# Patient Record
Sex: MALE | Race: WHITE | NOT HISPANIC OR LATINO | Employment: OTHER | URBAN - METROPOLITAN AREA
[De-identification: names, ages, dates, MRNs, and addresses within clinical notes are randomized per-mention and may not be internally consistent; named-entity substitution may affect disease eponyms.]

---

## 2024-10-15 DIAGNOSIS — I73.9 PAD (PERIPHERAL ARTERY DISEASE): Primary | ICD-10-CM

## 2024-12-16 NOTE — PROGRESS NOTES
[facility] Vascular - Clinic Note  Alon Izaguirre MD      Patient Name: Driss Morales                   : 1960      MRN: [id number]   Visit Date: 2024       History Present Illness     Reason for Visit: Arterial Disease    Mr. Morales presents to the clinic for evaluation of right lower extremity arterial disease.  He was referred over by Dr. Isaac in [place] for evaluation osteomyelitis and arterial insufficiency.  He has a history of bilateral BKAs 13 years ago after a swine flu infection.  He recently developed some osteomyelitis in his right residual limb.  He was treated with antibiotics.  He was states that a surgeon in [place] told him he needed an above-knee amputation which he did not want to proceed with.  He presents today for evaluation.  He denies any symptoms of pain or ischemia to the right lower extremity        REVIEW OF SYSTEMS:  12 point review of systems conducted, negative except as stated in the history of present illness. See HPI for details.        Physical Exam      Vitals:    24 1130 24 1133   BP: (!) 141/86 (!) 142/92   BP Location: Left arm Right arm   Patient Position: Sitting    Pulse: 99 (P) 101   Weight: 108.9 kg (240 lb)    Height: 6' (1.829 m)           General: well-nourished, no acute distress, and healthy appearing, alert, pleasant, conversant, and oriented  Neurologic: cranial nerves are grossly intact, no neurologic deficits, no motor deficits, and no sensory deficits  Neck/Chest: normal , soft without lymphadenopathy, and no carotid bruits noted  Respiratory: breathing easily, without respiratory distress, and normal breath sounds  Abdomen: normal and soft  Cardiology: regular rate and rhythm and no audible murmur    Upper Extremity Arterial Exam:   Right - radial is palpable and brachial is palpable  Left - radial is palpable and brachial is palpable    Musculoskeletal:   Lower Extremity: right leg is amputated below the knee and left  leg is amputated below the knee 5/5 strength     Wound:   Location: right stump  Small superficial opening at the distal aspect.  Below-knee lower extremity musculature is somewhat atrophic.              Assessment and Plan     Mr. Morales is a 64 y.o. male with right lower extremity critical limb ischemia.  I discussed with him that his runoff is somewhat perilous.  I would recommend right-to-left fem-fem bypass.  I think even if he does get an AKA at this point, he would have significant wound healing issues.  He does see Wound Care and Infectious Disease in [place].  We will plan for bypass.  I did explain the risks and benefits to him.  I also recommend he start a low-dose 81 mg aspirin at this time as well.  Is at higher risk for further limb loss down the road as well.      1. Amputation stump infection  - Basic Metabolic Panel; Future  - CBC Auto Differential; Future  - EKG 12-lead; Future  - X-Ray Chest PA And Lateral; Future    2. Thrombosis of arteries of lower extremity  - Basic Metabolic Panel; Future  - CBC Auto Differential; Future  - EKG 12-lead; Future  - X-Ray Chest PA And Lateral; Future    3. Atherosclerosis of native arteries of extremities with intermittent claudication, right leg          Imaging Obtained/Reviewed   Study: CTA aorta/runoff  Date:   10/17/24  This shows an occlusion of the right common iliac through external iliac and common femoral.  The profunda refills via collaterals and is the only runoff to the right lower extremity.    There is a mention of a report of an MRI showing osteomyelitis in the right BKA residual limb, however I do not have the report itself    Medical History     Past Medical History:   Diagnosis Date    HTN (hypertension)     Osteomyelitis     lower leg    Peripheral artery occlusion     Peripheral vascular obstructive disease      Past Surgical History:   Procedure Laterality Date    BELOW KNEE AMPUTATION OF LOWER EXTREMITY Bilateral 2011    secondary to  Swine Flu infection     No family history on file.  Social History     Socioeconomic History    Marital status:    Tobacco Use    Smoking status: Former     Types: Cigarettes    Smokeless tobacco: Never     Current Outpatient Medications   Medication Instructions    ciprofloxacin HCl (CIPRO) 750 mg, 2 times daily    doxycycline (MONODOX) 100 mg, 2 times daily    eszopiclone (LUNESTA) 2 mg, Nightly PRN    gabapentin (NEURONTIN) 800 mg, 3 times daily    lisinopriL 10 mg    oxyCODONE (ROXICODONE) 15 mg    oxyCODONE-acetaminophen (PERCOCET)  mg per tablet 1 tablet, Every 4 hours PRN     Review of patient's allergies indicates:   Allergen Reactions    Penicillins        Patient Care Team:  Alex Palmer Jr., MD as PCP - General (Family Medicine)  Mackenzie Isaac NP as Nurse Practitioner (Cardiology)        No follow-ups on file. In addition to their scheduled follow up, the patient has also been instructed to follow up on as needed basis.     No future appointments.

## 2024-12-16 NOTE — H&P (VIEW-ONLY)
[facility] Vascular - Clinic Note  Alon Izaguirre MD      Patient Name: Driss Morales                   : 1960      MRN: [id number]   Visit Date: 2024       History Present Illness     Reason for Visit: Arterial Disease    Mr. Morales presents to the clinic for evaluation of right lower extremity arterial disease.  He was referred over by Dr. Isaac in [place] for evaluation osteomyelitis and arterial insufficiency.  He has a history of bilateral BKAs 13 years ago after a swine flu infection.  He recently developed some osteomyelitis in his right residual limb.  He was treated with antibiotics.  He was states that a surgeon in [place] told him he needed an above-knee amputation which he did not want to proceed with.  He presents today for evaluation.  He denies any symptoms of pain or ischemia to the right lower extremity        REVIEW OF SYSTEMS:  12 point review of systems conducted, negative except as stated in the history of present illness. See HPI for details.        Physical Exam      Vitals:    24 1130 24 1133   BP: (!) 141/86 (!) 142/92   BP Location: Left arm Right arm   Patient Position: Sitting    Pulse: 99 (P) 101   Weight: 108.9 kg (240 lb)    Height: 6' (1.829 m)           General: well-nourished, no acute distress, and healthy appearing, alert, pleasant, conversant, and oriented  Neurologic: cranial nerves are grossly intact, no neurologic deficits, no motor deficits, and no sensory deficits  Neck/Chest: normal , soft without lymphadenopathy, and no carotid bruits noted  Respiratory: breathing easily, without respiratory distress, and normal breath sounds  Abdomen: normal and soft  Cardiology: regular rate and rhythm and no audible murmur    Upper Extremity Arterial Exam:   Right - radial is palpable and brachial is palpable  Left - radial is palpable and brachial is palpable    Musculoskeletal:   Lower Extremity: right leg is amputated below the knee and left  leg is amputated below the knee 5/5 strength     Wound:   Location: right stump  Small superficial opening at the distal aspect.  Below-knee lower extremity musculature is somewhat atrophic.              Assessment and Plan     Mr. Morales is a 64 y.o. male with right lower extremity critical limb ischemia.  I discussed with him that his runoff is somewhat perilous.  I would recommend right-to-left fem-fem bypass.  I think even if he does get an AKA at this point, he would have significant wound healing issues.  He does see Wound Care and Infectious Disease in [place].  We will plan for bypass.  I did explain the risks and benefits to him.  I also recommend he start a low-dose 81 mg aspirin at this time as well.  Is at higher risk for further limb loss down the road as well.      1. Amputation stump infection  - Basic Metabolic Panel; Future  - CBC Auto Differential; Future  - EKG 12-lead; Future  - X-Ray Chest PA And Lateral; Future    2. Thrombosis of arteries of lower extremity  - Basic Metabolic Panel; Future  - CBC Auto Differential; Future  - EKG 12-lead; Future  - X-Ray Chest PA And Lateral; Future    3. Atherosclerosis of native arteries of extremities with intermittent claudication, right leg          Imaging Obtained/Reviewed   Study: CTA aorta/runoff  Date:   10/17/24  This shows an occlusion of the right common iliac through external iliac and common femoral.  The profunda refills via collaterals and is the only runoff to the right lower extremity.    There is a mention of a report of an MRI showing osteomyelitis in the right BKA residual limb, however I do not have the report itself    Medical History     Past Medical History:   Diagnosis Date    HTN (hypertension)     Osteomyelitis     lower leg    Peripheral artery occlusion     Peripheral vascular obstructive disease      Past Surgical History:   Procedure Laterality Date    BELOW KNEE AMPUTATION OF LOWER EXTREMITY Bilateral 2011    secondary to  Swine Flu infection     No family history on file.  Social History     Socioeconomic History    Marital status:    Tobacco Use    Smoking status: Former     Types: Cigarettes    Smokeless tobacco: Never     Current Outpatient Medications   Medication Instructions    ciprofloxacin HCl (CIPRO) 750 mg, 2 times daily    doxycycline (MONODOX) 100 mg, 2 times daily    eszopiclone (LUNESTA) 2 mg, Nightly PRN    gabapentin (NEURONTIN) 800 mg, 3 times daily    lisinopriL 10 mg    oxyCODONE (ROXICODONE) 15 mg    oxyCODONE-acetaminophen (PERCOCET)  mg per tablet 1 tablet, Every 4 hours PRN     Review of patient's allergies indicates:   Allergen Reactions    Penicillins        Patient Care Team:  Alex Palmer Jr., MD as PCP - General (Family Medicine)  Mackenzie Isaac NP as Nurse Practitioner (Cardiology)        No follow-ups on file. In addition to their scheduled follow up, the patient has also been instructed to follow up on as needed basis.     No future appointments.

## 2024-12-18 ENCOUNTER — OFFICE VISIT (OUTPATIENT)
Dept: VASCULAR SURGERY | Facility: CLINIC | Age: 64
End: 2024-12-18
Payer: MEDICARE

## 2024-12-18 VITALS
HEIGHT: 72 IN | SYSTOLIC BLOOD PRESSURE: 142 MMHG | HEART RATE: 99 BPM | BODY MASS INDEX: 32.51 KG/M2 | DIASTOLIC BLOOD PRESSURE: 92 MMHG | WEIGHT: 240 LBS

## 2024-12-18 DIAGNOSIS — T87.40 AMPUTATION STUMP INFECTION: Primary | ICD-10-CM

## 2024-12-18 DIAGNOSIS — I70.211 ATHEROSCLEROSIS OF NATIVE ARTERIES OF EXTREMITIES WITH INTERMITTENT CLAUDICATION, RIGHT LEG: ICD-10-CM

## 2024-12-18 DIAGNOSIS — I70.211 ATHEROSCLEROSIS OF NATIVE ARTERY OF RIGHT LOWER EXTREMITY WITH INTERMITTENT CLAUDICATION: ICD-10-CM

## 2024-12-18 DIAGNOSIS — I70.211 ATHEROSCLEROSIS OF NATIVE ARTERIES OF EXTREMITIES WITH INTERMITTENT CLAUDICATION, RIGHT LEG: Primary | ICD-10-CM

## 2024-12-18 DIAGNOSIS — I74.3 THROMBOSIS OF ARTERIES OF LOWER EXTREMITY: ICD-10-CM

## 2024-12-18 PROCEDURE — 3077F SYST BP >= 140 MM HG: CPT | Mod: CPTII,,, | Performed by: SURGERY

## 2024-12-18 PROCEDURE — 99204 OFFICE O/P NEW MOD 45 MIN: CPT | Mod: ,,, | Performed by: SURGERY

## 2024-12-18 PROCEDURE — 1101F PT FALLS ASSESS-DOCD LE1/YR: CPT | Mod: CPTII,,, | Performed by: SURGERY

## 2024-12-18 PROCEDURE — 3079F DIAST BP 80-89 MM HG: CPT | Mod: CPTII,,, | Performed by: SURGERY

## 2024-12-18 PROCEDURE — 3288F FALL RISK ASSESSMENT DOCD: CPT | Mod: CPTII,,, | Performed by: SURGERY

## 2024-12-18 PROCEDURE — 3008F BODY MASS INDEX DOCD: CPT | Mod: CPTII,,, | Performed by: SURGERY

## 2024-12-18 PROCEDURE — 4010F ACE/ARB THERAPY RXD/TAKEN: CPT | Mod: CPTII,,, | Performed by: SURGERY

## 2024-12-18 RX ORDER — SODIUM CHLORIDE 9 MG/ML
INJECTION, SOLUTION INTRAVENOUS CONTINUOUS
OUTPATIENT
Start: 2024-12-18

## 2024-12-19 RX ORDER — ASPIRIN 81 MG/1
81 TABLET ORAL DAILY
COMMUNITY

## 2024-12-30 ENCOUNTER — ANESTHESIA EVENT (OUTPATIENT)
Dept: SURGERY | Facility: HOSPITAL | Age: 64
End: 2024-12-30
Payer: MEDICARE

## 2025-01-06 ENCOUNTER — TELEPHONE (OUTPATIENT)
Dept: VASCULAR SURGERY | Facility: CLINIC | Age: 65
End: 2025-01-06
Payer: MEDICARE

## 2025-01-06 NOTE — PRE-PROCEDURE INSTRUCTIONS
"[facility]: Outpatient Surgery  Preprocedure Check-In Instructions     Your arrival time for your surgery or procedure is __7 am____.  We ask patients to arrive about 2 hours before surgery to allow for enough time to review your health history & medications, start your IV, complete any outstanding labwork or tests, and meet your Anesthesiologist.    Expectations: "Because of inconsistent procedure completion times, an unexpected wait may occur. The Physicians would like you to be here to prepare in the event they run ahead of time. We will make you as comfortable as possible and keep you informed. We apologize in advance if this happens."    You will arrive at [facility], [address], [place].  Enter through the [facility] entrance next to the Emergency Room, and come to the 6th floor to the Outpatient Surgery Department.     Visitory Policy:  You are allowed 2 adult visitors to be with you in the hospital. All hospital visitors should be in good current health.  No small children.     What to Bring:  Please have your ID, insurance cards, and all home medication bottles with you at check in.  Bring your CPAP machine if one is used at home.     Fasting:  Nothing to eat after midnight the night before your procedure. This includes no gum and/or tobacco products. You may have clear liquids limited to only: WATER, GATORADE, POWERADE and children can also have PEDIALYTE AND/OR APPLE JUICE , up until 2 hours before your arrival time.   Follow your doctor's instructions for taking any medications on the morning of your procedure.  If no instructions for taking medications were given, do not take any medications but bring your medications in their bottles to your procedure check in.     Follow your doctor's preoperative instructions regarding skin prep, bowel prep, bathing, or showering prior to your procedure.  If any special soaps were provided to you, please use according " to your doctor's instructions. If no instructions were given from your doctor, take a good bath or shower with antibacterial soap the night before and the morning of your procedure.  On the morning of procedure, wear loose, comfortable clothing.  No lotions, makeup, perfumes, colognes, deodorant, or jewelry to your procedure.  Removable items (glasses, contact lenses, dentures, retainers, hearing aids) need to be removed for your procedure.  Bring your storage containers for these items if you wear them.     Artificial nails, body jewelry, eyelash extensions, and/or hair extensions with metal clips are not allowed during your surgery.  If you currently wear any of these items, please arrange for them to be removed prior to your arrival to the hospital.     Outpatient or Same Day Surgeries:  Any patients receiving sedation/anesthesia are advised not to drive for 24 hours after their procedure.  We do not allow patients to drive themselves home after discharge.  If you are going home after your procedure, please have someone available to drive you home from the hospital.        You may call the Outpatient Surgery Department at [phone] with any questions or concerns.  We are looking forward to meeting you and taking great care of you for your procedure.  Thank you for choosing [facility] for your surgical needs.

## 2025-01-07 ENCOUNTER — ANESTHESIA (OUTPATIENT)
Dept: SURGERY | Facility: HOSPITAL | Age: 65
End: 2025-01-07
Payer: MEDICARE

## 2025-01-07 ENCOUNTER — HOSPITAL ENCOUNTER (INPATIENT)
Facility: HOSPITAL | Age: 65
LOS: 1 days | Discharge: HOME | DRG: 253 | End: 2025-01-08
Attending: SURGERY | Admitting: SURGERY
Payer: MEDICARE

## 2025-01-07 DIAGNOSIS — I70.211 ATHEROSCLEROSIS OF NATIVE ARTERIES OF EXTREMITIES WITH INTERMITTENT CLAUDICATION, RIGHT LEG: Primary | ICD-10-CM

## 2025-01-07 DIAGNOSIS — T87.40 AMPUTATION STUMP INFECTION: ICD-10-CM

## 2025-01-07 DIAGNOSIS — I74.3 THROMBOSIS OF ARTERIES OF LOWER EXTREMITY: ICD-10-CM

## 2025-01-07 LAB
ABORH RETYPE: NORMAL
BASOPHILS # BLD AUTO: 0.1 X10(3)/MCL
BASOPHILS NFR BLD AUTO: 1.4 %
EOSINOPHIL # BLD AUTO: 0.82 X10(3)/MCL (ref 0–0.9)
EOSINOPHIL NFR BLD AUTO: 11.3 %
ERYTHROCYTE [DISTWIDTH] IN BLOOD BY AUTOMATED COUNT: 14.4 % (ref 11.5–17)
GROUP & RH: NORMAL
HCT VFR BLD AUTO: 43.5 % (ref 42–52)
HGB BLD-MCNC: 14.6 G/DL (ref 14–18)
IMM GRANULOCYTES # BLD AUTO: 0.04 X10(3)/MCL (ref 0–0.04)
IMM GRANULOCYTES NFR BLD AUTO: 0.5 %
INDIRECT COOMBS: NORMAL
LYMPHOCYTES # BLD AUTO: 1.9 X10(3)/MCL (ref 0.6–4.6)
LYMPHOCYTES NFR BLD AUTO: 26.1 %
MCH RBC QN AUTO: 30.6 PG (ref 27–31)
MCHC RBC AUTO-ENTMCNC: 33.6 G/DL (ref 33–36)
MCV RBC AUTO: 91.2 FL (ref 80–94)
MONOCYTES # BLD AUTO: 0.53 X10(3)/MCL (ref 0.1–1.3)
MONOCYTES NFR BLD AUTO: 7.3 %
NEUTROPHILS # BLD AUTO: 3.89 X10(3)/MCL (ref 2.1–9.2)
NEUTROPHILS NFR BLD AUTO: 53.4 %
NRBC BLD AUTO-RTO: 0 %
PLATELET # BLD AUTO: 206 X10(3)/MCL (ref 130–400)
PMV BLD AUTO: 9.1 FL (ref 7.4–10.4)
RBC # BLD AUTO: 4.77 X10(6)/MCL (ref 4.7–6.1)
SPECIMEN OUTDATE: NORMAL
WBC # BLD AUTO: 7.28 X10(3)/MCL (ref 4.5–11.5)

## 2025-01-07 PROCEDURE — 71000033 HC RECOVERY, INTIAL HOUR: Performed by: SURGERY

## 2025-01-07 PROCEDURE — 63600175 PHARM REV CODE 636 W HCPCS: Performed by: PHYSICIAN ASSISTANT

## 2025-01-07 PROCEDURE — 21400001 HC TELEMETRY ROOM

## 2025-01-07 PROCEDURE — L8670 VASCULAR GRAFT, SYNTHETIC: HCPCS | Performed by: SURGERY

## 2025-01-07 PROCEDURE — 041L0JH BYPASS LEFT FEMORAL ARTERY TO RIGHT FEMORAL ARTERY WITH SYNTHETIC SUBSTITUTE, OPEN APPROACH: ICD-10-PCS | Performed by: SURGERY

## 2025-01-07 PROCEDURE — P9047 ALBUMIN (HUMAN), 25%, 50ML: HCPCS | Performed by: NURSE ANESTHETIST, CERTIFIED REGISTERED

## 2025-01-07 PROCEDURE — 11000001 HC ACUTE MED/SURG PRIVATE ROOM

## 2025-01-07 PROCEDURE — 85025 COMPLETE CBC W/AUTO DIFF WBC: CPT

## 2025-01-07 PROCEDURE — 25000003 PHARM REV CODE 250: Performed by: SURGERY

## 2025-01-07 PROCEDURE — 37000008 HC ANESTHESIA 1ST 15 MINUTES: Performed by: SURGERY

## 2025-01-07 PROCEDURE — 35661 BPG FEMORAL-FEMORAL: CPT | Mod: RT,,, | Performed by: SURGERY

## 2025-01-07 PROCEDURE — 63600175 PHARM REV CODE 636 W HCPCS: Mod: TB | Performed by: SURGERY

## 2025-01-07 PROCEDURE — 63600175 PHARM REV CODE 636 W HCPCS: Performed by: NURSE ANESTHETIST, CERTIFIED REGISTERED

## 2025-01-07 PROCEDURE — 88311 DECALCIFY TISSUE: CPT

## 2025-01-07 PROCEDURE — 35661 BPG FEMORAL-FEMORAL: CPT | Mod: AS,RT,, | Performed by: PHYSICIAN ASSISTANT

## 2025-01-07 PROCEDURE — 25000003 PHARM REV CODE 250: Performed by: NURSE ANESTHETIST, CERTIFIED REGISTERED

## 2025-01-07 PROCEDURE — 63600175 PHARM REV CODE 636 W HCPCS: Performed by: SURGERY

## 2025-01-07 PROCEDURE — 71000039 HC RECOVERY, EACH ADD'L HOUR: Performed by: SURGERY

## 2025-01-07 PROCEDURE — 86901 BLOOD TYPING SEROLOGIC RH(D): CPT | Performed by: SURGERY

## 2025-01-07 PROCEDURE — 37000009 HC ANESTHESIA EA ADD 15 MINS: Performed by: SURGERY

## 2025-01-07 PROCEDURE — 36415 COLL VENOUS BLD VENIPUNCTURE: CPT | Performed by: SURGERY

## 2025-01-07 PROCEDURE — 71000015 HC POSTOP RECOV 1ST HR: Performed by: SURGERY

## 2025-01-07 PROCEDURE — 86923 COMPATIBILITY TEST ELECTRIC: CPT | Performed by: SURGERY

## 2025-01-07 PROCEDURE — 36000709 HC OR TIME LEV III EA ADD 15 MIN: Performed by: SURGERY

## 2025-01-07 PROCEDURE — 71000016 HC POSTOP RECOV ADDL HR: Performed by: SURGERY

## 2025-01-07 PROCEDURE — 36000708 HC OR TIME LEV III 1ST 15 MIN: Performed by: SURGERY

## 2025-01-07 PROCEDURE — 27201423 OPTIME MED/SURG SUP & DEVICES STERILE SUPPLY: Performed by: SURGERY

## 2025-01-07 PROCEDURE — 88304 TISSUE EXAM BY PATHOLOGIST: CPT | Performed by: SURGERY

## 2025-01-07 PROCEDURE — 04CK0ZZ EXTIRPATION OF MATTER FROM RIGHT FEMORAL ARTERY, OPEN APPROACH: ICD-10-PCS | Performed by: SURGERY

## 2025-01-07 PROCEDURE — 25000003 PHARM REV CODE 250: Performed by: PHYSICIAN ASSISTANT

## 2025-01-07 DEVICE — PROPATEN VASCULAR GRAFT TW RR 8MMX40CM 30CM RINGS HEPARIN
Type: IMPLANTABLE DEVICE | Site: FEMORAL | Status: FUNCTIONAL
Brand: GORE PROPATEN VASCULAR GRAFT

## 2025-01-07 RX ORDER — ACETAMINOPHEN 10 MG/ML
INJECTION, SOLUTION INTRAVENOUS
Status: DISCONTINUED | OUTPATIENT
Start: 2025-01-07 | End: 2025-01-07

## 2025-01-07 RX ORDER — ROCURONIUM BROMIDE 10 MG/ML
INJECTION, SOLUTION INTRAVENOUS
Status: DISCONTINUED | OUTPATIENT
Start: 2025-01-07 | End: 2025-01-07

## 2025-01-07 RX ORDER — KETOROLAC TROMETHAMINE 10 MG/1
10 TABLET, FILM COATED ORAL EVERY 6 HOURS PRN
COMMUNITY
Start: 2025-01-06

## 2025-01-07 RX ORDER — VANCOMYCIN HYDROCHLORIDE 1 G/20ML
INJECTION, POWDER, LYOPHILIZED, FOR SOLUTION INTRAVENOUS
Status: DISCONTINUED | OUTPATIENT
Start: 2025-01-07 | End: 2025-01-07 | Stop reason: HOSPADM

## 2025-01-07 RX ORDER — DEXAMETHASONE SODIUM PHOSPHATE 4 MG/ML
INJECTION, SOLUTION INTRA-ARTICULAR; INTRALESIONAL; INTRAMUSCULAR; INTRAVENOUS; SOFT TISSUE
Status: DISCONTINUED | OUTPATIENT
Start: 2025-01-07 | End: 2025-01-07

## 2025-01-07 RX ORDER — MUPIROCIN 20 MG/G
OINTMENT TOPICAL 2 TIMES DAILY
Status: DISCONTINUED | OUTPATIENT
Start: 2025-01-07 | End: 2025-01-08 | Stop reason: HOSPADM

## 2025-01-07 RX ORDER — KETOROLAC TROMETHAMINE 30 MG/ML
15 INJECTION, SOLUTION INTRAMUSCULAR; INTRAVENOUS EVERY 8 HOURS PRN
Status: DISCONTINUED | OUTPATIENT
Start: 2025-01-07 | End: 2025-01-07 | Stop reason: HOSPADM

## 2025-01-07 RX ORDER — GLUCAGON 1 MG
1 KIT INJECTION
Status: DISCONTINUED | OUTPATIENT
Start: 2025-01-07 | End: 2025-01-07 | Stop reason: HOSPADM

## 2025-01-07 RX ORDER — HALOPERIDOL 5 MG/ML
0.5 INJECTION INTRAMUSCULAR EVERY 10 MIN PRN
Status: DISCONTINUED | OUTPATIENT
Start: 2025-01-07 | End: 2025-01-07 | Stop reason: HOSPADM

## 2025-01-07 RX ORDER — ONDANSETRON HYDROCHLORIDE 2 MG/ML
4 INJECTION, SOLUTION INTRAVENOUS EVERY 12 HOURS PRN
Status: DISCONTINUED | OUTPATIENT
Start: 2025-01-07 | End: 2025-01-08 | Stop reason: HOSPADM

## 2025-01-07 RX ORDER — MIDAZOLAM HYDROCHLORIDE 1 MG/ML
INJECTION INTRAMUSCULAR; INTRAVENOUS
Status: DISCONTINUED | OUTPATIENT
Start: 2025-01-07 | End: 2025-01-07

## 2025-01-07 RX ORDER — KETOROLAC TROMETHAMINE 30 MG/ML
INJECTION, SOLUTION INTRAMUSCULAR; INTRAVENOUS
Status: DISCONTINUED | OUTPATIENT
Start: 2025-01-07 | End: 2025-01-07

## 2025-01-07 RX ORDER — GABAPENTIN 400 MG/1
800 CAPSULE ORAL 3 TIMES DAILY
Status: DISCONTINUED | OUTPATIENT
Start: 2025-01-07 | End: 2025-01-08 | Stop reason: HOSPADM

## 2025-01-07 RX ORDER — ONDANSETRON HYDROCHLORIDE 2 MG/ML
INJECTION, SOLUTION INTRAVENOUS
Status: DISCONTINUED | OUTPATIENT
Start: 2025-01-07 | End: 2025-01-07

## 2025-01-07 RX ORDER — OXYCODONE AND ACETAMINOPHEN 5; 325 MG/1; MG/1
1 TABLET ORAL
Status: DISCONTINUED | OUTPATIENT
Start: 2025-01-07 | End: 2025-01-07 | Stop reason: HOSPADM

## 2025-01-07 RX ORDER — FENTANYL CITRATE 50 UG/ML
INJECTION, SOLUTION INTRAMUSCULAR; INTRAVENOUS
Status: DISCONTINUED | OUTPATIENT
Start: 2025-01-07 | End: 2025-01-07

## 2025-01-07 RX ORDER — MORPHINE SULFATE 4 MG/ML
3 INJECTION, SOLUTION INTRAMUSCULAR; INTRAVENOUS
Status: DISCONTINUED | OUTPATIENT
Start: 2025-01-07 | End: 2025-01-08 | Stop reason: HOSPADM

## 2025-01-07 RX ORDER — HEPARIN SODIUM 5000 [USP'U]/ML
INJECTION, SOLUTION INTRAVENOUS; SUBCUTANEOUS
Status: DISCONTINUED | OUTPATIENT
Start: 2025-01-07 | End: 2025-01-07 | Stop reason: HOSPADM

## 2025-01-07 RX ORDER — HYDRALAZINE HYDROCHLORIDE 20 MG/ML
10 INJECTION INTRAMUSCULAR; INTRAVENOUS ONCE
Status: DISCONTINUED | OUTPATIENT
Start: 2025-01-07 | End: 2025-01-07 | Stop reason: HOSPADM

## 2025-01-07 RX ORDER — PHENYLEPHRINE HYDROCHLORIDE 10 MG/ML
INJECTION INTRAVENOUS
Status: DISCONTINUED | OUTPATIENT
Start: 2025-01-07 | End: 2025-01-07

## 2025-01-07 RX ORDER — EPHEDRINE SULFATE 50 MG/ML
INJECTION, SOLUTION INTRAVENOUS
Status: DISCONTINUED | OUTPATIENT
Start: 2025-01-07 | End: 2025-01-07

## 2025-01-07 RX ORDER — HEPARIN SODIUM 1000 [USP'U]/ML
INJECTION, SOLUTION INTRAVENOUS; SUBCUTANEOUS
Status: DISCONTINUED | OUTPATIENT
Start: 2025-01-07 | End: 2025-01-07

## 2025-01-07 RX ORDER — PROPOFOL 10 MG/ML
VIAL (ML) INTRAVENOUS
Status: DISCONTINUED | OUTPATIENT
Start: 2025-01-07 | End: 2025-01-07

## 2025-01-07 RX ORDER — ALBUMIN HUMAN 250 G/1000ML
SOLUTION INTRAVENOUS
Status: DISCONTINUED | OUTPATIENT
Start: 2025-01-07 | End: 2025-01-07

## 2025-01-07 RX ORDER — HYDROMORPHONE HYDROCHLORIDE 2 MG/ML
0.2 INJECTION, SOLUTION INTRAMUSCULAR; INTRAVENOUS; SUBCUTANEOUS EVERY 5 MIN PRN
Status: DISCONTINUED | OUTPATIENT
Start: 2025-01-07 | End: 2025-01-07 | Stop reason: HOSPADM

## 2025-01-07 RX ORDER — HYDROCODONE BITARTRATE AND ACETAMINOPHEN 5; 325 MG/1; MG/1
1 TABLET ORAL EVERY 4 HOURS PRN
Status: DISCONTINUED | OUTPATIENT
Start: 2025-01-07 | End: 2025-01-08 | Stop reason: HOSPADM

## 2025-01-07 RX ORDER — ASPIRIN 81 MG/1
81 TABLET ORAL DAILY
Status: DISCONTINUED | OUTPATIENT
Start: 2025-01-08 | End: 2025-01-08 | Stop reason: HOSPADM

## 2025-01-07 RX ORDER — TALC
6 POWDER (GRAM) TOPICAL NIGHTLY PRN
Status: DISCONTINUED | OUTPATIENT
Start: 2025-01-07 | End: 2025-01-08 | Stop reason: HOSPADM

## 2025-01-07 RX ORDER — PROTAMINE SULFATE 10 MG/ML
INJECTION, SOLUTION INTRAVENOUS
Status: DISCONTINUED | OUTPATIENT
Start: 2025-01-07 | End: 2025-01-07

## 2025-01-07 RX ORDER — MIDAZOLAM HYDROCHLORIDE 2 MG/2ML
2 INJECTION, SOLUTION INTRAMUSCULAR; INTRAVENOUS ONCE
Status: COMPLETED | OUTPATIENT
Start: 2025-01-07 | End: 2025-01-07

## 2025-01-07 RX ORDER — MEPERIDINE HYDROCHLORIDE 25 MG/ML
12.5 INJECTION INTRAMUSCULAR; INTRAVENOUS; SUBCUTANEOUS EVERY 10 MIN PRN
Status: DISCONTINUED | OUTPATIENT
Start: 2025-01-07 | End: 2025-01-07 | Stop reason: HOSPADM

## 2025-01-07 RX ORDER — SODIUM CHLORIDE 9 MG/ML
INJECTION, SOLUTION INTRAVENOUS CONTINUOUS
Status: DISCONTINUED | OUTPATIENT
Start: 2025-01-07 | End: 2025-01-08 | Stop reason: HOSPADM

## 2025-01-07 RX ORDER — LISINOPRIL 10 MG/1
10 TABLET ORAL DAILY
Status: DISCONTINUED | OUTPATIENT
Start: 2025-01-08 | End: 2025-01-08 | Stop reason: HOSPADM

## 2025-01-07 RX ORDER — CALCIUM CHLORIDE INJECTION 100 MG/ML
INJECTION, SOLUTION INTRAVENOUS
Status: DISCONTINUED | OUTPATIENT
Start: 2025-01-07 | End: 2025-01-07

## 2025-01-07 RX ORDER — MUPIROCIN 20 MG/G
OINTMENT TOPICAL 2 TIMES DAILY
Status: DISCONTINUED | OUTPATIENT
Start: 2025-01-07 | End: 2025-01-07

## 2025-01-07 RX ORDER — LIDOCAINE HYDROCHLORIDE 20 MG/ML
INJECTION, SOLUTION EPIDURAL; INFILTRATION; INTRACAUDAL; PERINEURAL
Status: DISCONTINUED | OUTPATIENT
Start: 2025-01-07 | End: 2025-01-07

## 2025-01-07 RX ADMIN — HEPARIN SODIUM 9000 UNITS: 1000 INJECTION, SOLUTION INTRAVENOUS; SUBCUTANEOUS at 11:01

## 2025-01-07 RX ADMIN — PHENYLEPHRINE HYDROCHLORIDE 100 MCG: 10 INJECTION INTRAVENOUS at 11:01

## 2025-01-07 RX ADMIN — ONDANSETRON 4 MG: 2 INJECTION INTRAMUSCULAR; INTRAVENOUS at 11:01

## 2025-01-07 RX ADMIN — OXYCODONE HYDROCHLORIDE AND ACETAMINOPHEN 1 TABLET: 5; 325 TABLET ORAL at 04:01

## 2025-01-07 RX ADMIN — LIDOCAINE HYDROCHLORIDE 80 MG: 20 INJECTION, SOLUTION INTRAVENOUS at 11:01

## 2025-01-07 RX ADMIN — CALCIUM CHLORIDE INJECTION 0.5 G: 100 INJECTION, SOLUTION INTRAVENOUS at 11:01

## 2025-01-07 RX ADMIN — PHENYLEPHRINE HYDROCHLORIDE 100 MCG: 10 INJECTION INTRAVENOUS at 12:01

## 2025-01-07 RX ADMIN — HYDROMORPHONE HYDROCHLORIDE 0.2 MG: 2 INJECTION, SOLUTION INTRAMUSCULAR; INTRAVENOUS; SUBCUTANEOUS at 01:01

## 2025-01-07 RX ADMIN — CALCIUM CHLORIDE INJECTION 0.25 G: 100 INJECTION, SOLUTION INTRAVENOUS at 11:01

## 2025-01-07 RX ADMIN — SODIUM CHLORIDE, SODIUM GLUCONATE, SODIUM ACETATE, POTASSIUM CHLORIDE AND MAGNESIUM CHLORIDE: 526; 502; 368; 37; 30 INJECTION, SOLUTION INTRAVENOUS at 12:01

## 2025-01-07 RX ADMIN — EPHEDRINE SULFATE 10 MG: 50 INJECTION INTRAVENOUS at 11:01

## 2025-01-07 RX ADMIN — EPHEDRINE SULFATE 10 MG: 50 INJECTION INTRAVENOUS at 12:01

## 2025-01-07 RX ADMIN — MIDAZOLAM HYDROCHLORIDE 2 MG: 1 INJECTION, SOLUTION INTRAMUSCULAR; INTRAVENOUS at 10:01

## 2025-01-07 RX ADMIN — MUPIROCIN: 20 OINTMENT TOPICAL at 09:01

## 2025-01-07 RX ADMIN — GABAPENTIN 800 MG: 400 CAPSULE ORAL at 09:01

## 2025-01-07 RX ADMIN — Medication 6 MG: at 09:01

## 2025-01-07 RX ADMIN — ROCURONIUM BROMIDE 20 MG: 10 SOLUTION INTRAVENOUS at 12:01

## 2025-01-07 RX ADMIN — ALBUMIN (HUMAN) 100 ML: 12.5 SOLUTION INTRAVENOUS at 12:01

## 2025-01-07 RX ADMIN — ACETAMINOPHEN 1000 MG: 10 INJECTION, SOLUTION INTRAVENOUS at 01:01

## 2025-01-07 RX ADMIN — HYDROCODONE BITARTRATE AND ACETAMINOPHEN 1 TABLET: 5; 325 TABLET ORAL at 09:01

## 2025-01-07 RX ADMIN — HEPARIN SODIUM 3000 UNITS: 1000 INJECTION, SOLUTION INTRAVENOUS; SUBCUTANEOUS at 12:01

## 2025-01-07 RX ADMIN — ALBUMIN (HUMAN) 100 ML: 12.5 SOLUTION INTRAVENOUS at 11:01

## 2025-01-07 RX ADMIN — VANCOMYCIN HYDROCHLORIDE 1000 MG: 1 INJECTION, POWDER, LYOPHILIZED, FOR SOLUTION INTRAVENOUS at 07:01

## 2025-01-07 RX ADMIN — PROPOFOL 150 MG: 10 INJECTION, EMULSION INTRAVENOUS at 11:01

## 2025-01-07 RX ADMIN — PROTAMINE SULFATE 50 MG: 10 INJECTION, SOLUTION INTRAVENOUS at 12:01

## 2025-01-07 RX ADMIN — DEXAMETHASONE SODIUM PHOSPHATE 4 MG: 4 INJECTION, SOLUTION INTRA-ARTICULAR; INTRALESIONAL; INTRAMUSCULAR; INTRAVENOUS; SOFT TISSUE at 11:01

## 2025-01-07 RX ADMIN — FENTANYL CITRATE 100 MCG: 50 INJECTION, SOLUTION INTRAMUSCULAR; INTRAVENOUS at 11:01

## 2025-01-07 RX ADMIN — CALCIUM CHLORIDE INJECTION 0.25 G: 100 INJECTION, SOLUTION INTRAVENOUS at 12:01

## 2025-01-07 RX ADMIN — VANCOMYCIN HYDROCHLORIDE 1500 MG: 1.5 INJECTION, POWDER, LYOPHILIZED, FOR SOLUTION INTRAVENOUS at 09:01

## 2025-01-07 RX ADMIN — ROCURONIUM BROMIDE 50 MG: 10 SOLUTION INTRAVENOUS at 11:01

## 2025-01-07 RX ADMIN — KETOROLAC TROMETHAMINE 15 MG: 30 INJECTION, SOLUTION INTRAMUSCULAR; INTRAVENOUS at 01:01

## 2025-01-07 RX ADMIN — SODIUM CHLORIDE, SODIUM GLUCONATE, SODIUM ACETATE, POTASSIUM CHLORIDE AND MAGNESIUM CHLORIDE: 526; 502; 368; 37; 30 INJECTION, SOLUTION INTRAVENOUS at 11:01

## 2025-01-07 NOTE — TRANSFER OF CARE
"Anesthesia Transfer of Care Note    Patient: Driss Morales    Procedure(s) Performed: Procedure(s) (LRB):  CREATION, BYPASS, ARTERIAL, FEMORAL TO FEMORAL, USING GRAFT (N/A)    Patient location: PACU    Anesthesia Type: general    Transport from OR: Transported from OR on room air with adequate spontaneous ventilation    Post pain: adequate analgesia    Post assessment: no apparent anesthetic complications    Post vital signs: stable    Level of consciousness: sedated, awake and responds to stimulation    Nausea/Vomiting: no nausea/vomiting    Complications: none    Transfer of care protocol was followed      Last vitals: Visit Vitals  /77 (BP Location: Left arm, Patient Position: Lying)   Pulse 64   Temp 36.6 °C (97.9 °F) (Oral)   Resp 18   Ht 6' 1" (1.854 m)   Wt 95.5 kg (210 lb 8.6 oz)   SpO2 95%   BMI 27.78 kg/m²     "

## 2025-01-07 NOTE — OP NOTE
[facility]ascular[facility]  Operative Note        Surgery Date:  01/07/2025     Pre-op Diagnosis:    Right lower extremity critical Limb ischemia     Post-op Diagnosis:  Same     Procedure(s):  Left to right femoral-femoral crossover bypass graft using 8 mm ring PTFE, right common femoral endarterectomy    Indication for procedure: Driss Morales is a 64 y.o. male who has a history of bilateral below-knee amputations due to sepsis from swine flu many years ago.  Developed osteomyelitis of his right tibia.  Was noted on angiography to have an occluded right iliofemoral system as well as an occluded right SFA and popliteal.  His profunda was the only patent flow in the right.  He was taken for revascularization    Surgeon:  Alon Izaguirre MD    Assistant:  Circulator: Klaudia Orozco RN  Physician Assistant: Filiberto Ritter PA-C  Scrub Person: Yoon Kasper ST; Francis Santana ST     Anesthesia:  General     Findings:  Good Doppler signal in the profunda on the right on completion of the case    Complications: None     Estimated Blood Loss: 25 mL         Specimens:  1.  Right common femoral artery thrombus 2.  Right common femoral endothelium     Implants:  Implant Name Type Inv. Item Serial No.  Lot No. LRB No. Used Action   GRAFT THIN WALL 8 X 40CM - [id number]  GRAFT THIN WALL 8 X 40CM [id number] [employer]  N/A 1 Implanted       Drains: None    Procedure in detail:  After informed consent was obtained, and risks and benefits discussed with the patient, he was brought to the operating room placed supine.  After adequate general anesthesia was obtained, he was prepped and draped in a standard sterile fashion.  Oblique incision was made the right groin dissection was carried down to the common femoral artery was identified.  It was dissected along its length until the profunda was identified.  There were 2 profunda branches, 1 large and 1 smaller.  Both these were isolated with vessel loops.   Attention was then turned to the left groin.  Oblique incision was made dissection was carried down to the common femoral artery was identified.  It was dissected along its length proximally and distally until I had enough length for an anastomosis.  I then tunneled between the 2 incisions in the suprapubic space and passed an 8 mm ring PTFE graft.  The patient was then systemically heparinized.  I then clamped the profunda on the right.  A longitudinal arteriotomy was made.  I encountered a significant amount of thrombus in the artery that is more the consistency quin or mud.  This was passed off the field as specimen.  I did attempt to pass a Dhruv catheter into the aorta to perform open thrombectomy, however could not pass this is a full distance and get good backbleeding.  I then performed an endarterectomy in the common femoral artery extending down into the profunda.  I turned my attention to the left leg than.  I clamped proximally and distally.  A longitudinal arteriotomy was made.  The graft was spatulated an end-to-side anastomosis performed with a CV 6 Center Line-Nader suture.  Flow was then restored of the leg in the graft was flushed.  I then transected the graft in the right groin and spatulated it.  An end-to-side anastomosis was performed on the common femoral artery with CV 6 Center Line-Nader suture.  Flow was restored to the leg.  I did verify good Doppler signals in the profunda.  The wounds were made hemostatic.  There were then closed in layers using 2-0 Vicryl, 3-0 Vicryl for deep layers and a 4-0 Monocryl subcuticular for the skin.  Dermabond was placed on both incisions.  The patient was awakened brought to recovery in stable condition.    Condition: Good

## 2025-01-07 NOTE — ANESTHESIA PROCEDURE NOTES
Intubation    Date/Time: 1/7/2025 11:11 AM    Performed by: Tye Cruz CRNA  Authorized by: Gregor Haskins MD    Intubation:     Induction:  Intravenous    Intubated:  Postinduction    Mask Ventilation:  Easy mask    Attempts:  1    Attempted By:  CRNA    Method of Intubation:  Direct    Blade:  Panda 2    Laryngeal View Grade: Grade I - full view of cords      Difficult Airway Encountered?: No      Complications:  None    Airway Device:  Oral endotracheal tube    Airway Device Size:  7.0    Style/Cuff Inflation:  Cuffed    Tube secured:  22    Secured at:  The lips    Placement Verified By:  Capnometry and Revisualization with laryngoscopy    Complicating Factors:  None    Findings Post-Intubation:  BS equal bilateral and atraumatic/condition of teeth unchanged

## 2025-01-07 NOTE — ANESTHESIA PREPROCEDURE EVALUATION
01/07/2025  Driss Morales is a 64 y.o., male. He was referred over by Dr. Isaac in [place] for evaluation osteomyelitis and arterial insufficiency.  He has a history of bilateral BKAs 13 years ago after a swine flu infection.  He recently developed some osteomyelitis in his right residual limb.  He was treated with antibiotics.  He was states that a surgeon in [place] told him he needed an above-knee amputation which he did not want to proceed with.  He presents today for surgery.  He denies any symptoms of pain or ischemia to the right lower extremity       Vitals:    01/07/25 0746   BP: 120/70   Pulse: 65   Resp: (!) 21   Temp: 36.6 °C (97.9 °F)       Past Medical History:   Diagnosis Date    Atherosclerosis of native arteries of extremities with intermittent claudication, right leg     Digestive disorder     acid reflux    HTN (hypertension)     Obesity     Osteomyelitis     lower leg    Peripheral artery occlusion     Peripheral vascular obstructive disease      Prescription Medications  Within last 14 days from 01/07/25   Last Taken Last Updated   aspirin (ECOTRIN) 81 MG EC tablet        ciprofloxacin HCl (CIPRO) 750 MG tablet        doxycycline (MONODOX) 100 MG capsule        eszopiclone (LUNESTA) 2 MG Tab        gabapentin (NEURONTIN) 800 MG tablet        lisinopriL 10 MG tablet        oxyCODONE (ROXICODONE) 15 MG Tab        oxyCODONE-acetaminophen (PERCOCET)  mg per tablet             Labs from 12/27/24    Pre-op Assessment    I have reviewed the Patient Summary Reports.     I have reviewed the Nursing Notes. I have reviewed the NPO Status.   I have reviewed the Medications.     Review of Systems  Anesthesia Hx:  No problems with previous Anesthesia   History of prior surgery of interest to airway management or planning: (h/o Bilateral BKAs)  Previous anesthesia: General         Denies Family Hx of Anesthesia complications.   Personal Hx of Anesthesia complications                    Social:  Former Smoker       Hematology/Oncology:  Hematology Normal   Oncology Normal                                   EENT/Dental:  EENT/Dental Normal           Cardiovascular:  Exercise tolerance: good   Hypertension (did not take lisinopril this am)                                    Hypertension         Pulmonary:  Pulmonary Normal                       Renal/:  Renal/ Normal                 Hepatic/GI:  Hepatic/GI Normal                    Musculoskeletal:  Musculoskeletal Normal                Neurological:  Neurology Normal                                      Endocrine:  Endocrine Normal            Dermatological:  Skin Normal    Psych:  Psychiatric Normal                    Physical Exam  General: Well nourished, Cooperative, Alert and Oriented    Airway:  Mallampati: III / III  Mouth Opening: Normal  TM Distance: Normal  Tongue: Normal  Neck ROM: Normal ROM    Dental:  Intact  Missing teeth in back   Musculoskeletal:  Amputation  Bilateral BKAs  Skin:  Erythema, Puncture Wound, Open Wound  RLE Erythema and open sore      Anesthesia Plan  Type of Anesthesia, risks & benefits discussed:    Anesthesia Type: Gen ETT  Intra-op Monitoring Plan: Art Line  Post Op Pain Control Plan: multimodal analgesia  Induction:  IV  Airway Plan: Direct  Informed Consent: Informed consent signed with the Patient and all parties understand the risks and agree with anesthesia plan.  All questions answered. Patient consented to blood products? Yes  ASA Score: 2  Day of Surgery Review of History & Physical: H&P Update referred to the surgeon/provider.    Ready For Surgery From Anesthesia Perspective.     .

## 2025-01-08 VITALS
HEART RATE: 61 BPM | DIASTOLIC BLOOD PRESSURE: 63 MMHG | BODY MASS INDEX: 27.91 KG/M2 | OXYGEN SATURATION: 97 % | TEMPERATURE: 98 F | HEIGHT: 73 IN | RESPIRATION RATE: 20 BRPM | SYSTOLIC BLOOD PRESSURE: 113 MMHG | WEIGHT: 210.56 LBS

## 2025-01-08 PROCEDURE — 25000003 PHARM REV CODE 250: Performed by: PHYSICIAN ASSISTANT

## 2025-01-08 PROCEDURE — 97161 PT EVAL LOW COMPLEX 20 MIN: CPT

## 2025-01-08 PROCEDURE — 63600175 PHARM REV CODE 636 W HCPCS: Performed by: PHYSICIAN ASSISTANT

## 2025-01-08 RX ORDER — HYDROCODONE BITARTRATE AND ACETAMINOPHEN 5; 325 MG/1; MG/1
1 TABLET ORAL EVERY 4 HOURS PRN
Status: DISCONTINUED | OUTPATIENT
Start: 2025-01-08 | End: 2025-01-08 | Stop reason: HOSPADM

## 2025-01-08 RX ORDER — MUPIROCIN 20 MG/G
OINTMENT TOPICAL 2 TIMES DAILY
Status: DISCONTINUED | OUTPATIENT
Start: 2025-01-08 | End: 2025-01-08 | Stop reason: HOSPADM

## 2025-01-08 RX ORDER — HYDROCODONE BITARTRATE AND ACETAMINOPHEN 7.5; 325 MG/1; MG/1
1 TABLET ORAL EVERY 6 HOURS PRN
Qty: 28 TABLET | Refills: 0 | Status: SHIPPED | OUTPATIENT
Start: 2025-01-08 | End: 2025-01-27

## 2025-01-08 RX ADMIN — MUPIROCIN: 20 OINTMENT TOPICAL at 09:01

## 2025-01-08 RX ADMIN — HYDROCODONE BITARTRATE AND ACETAMINOPHEN 1 TABLET: 5; 325 TABLET ORAL at 04:01

## 2025-01-08 RX ADMIN — LISINOPRIL 10 MG: 10 TABLET ORAL at 08:01

## 2025-01-08 RX ADMIN — HYDROCODONE BITARTRATE AND ACETAMINOPHEN 1 TABLET: 5; 325 TABLET ORAL at 09:01

## 2025-01-08 RX ADMIN — MORPHINE SULFATE 3 MG: 4 INJECTION, SOLUTION INTRAMUSCULAR; INTRAVENOUS at 02:01

## 2025-01-08 RX ADMIN — GABAPENTIN 800 MG: 400 CAPSULE ORAL at 08:01

## 2025-01-08 RX ADMIN — Medication 81 MG: at 08:01

## 2025-01-08 RX ADMIN — MORPHINE SULFATE 3 MG: 4 INJECTION, SOLUTION INTRAMUSCULAR; INTRAVENOUS at 07:01

## 2025-01-08 RX ADMIN — RIVAROXABAN 2.5 MG: 2.5 TABLET, FILM COATED ORAL at 08:01

## 2025-01-08 NOTE — PLAN OF CARE
Problem: Adult Inpatient Plan of Care  Goal: Plan of Care Review  Outcome: Met  Goal: Patient-Specific Goal (Individualized)  Outcome: Met  Goal: Absence of Hospital-Acquired Illness or Injury  Outcome: Met  Goal: Optimal Comfort and Wellbeing  Outcome: Met  Goal: Readiness for Transition of Care  Outcome: Met     Problem: Infection  Goal: Absence of Infection Signs and Symptoms  Outcome: Met     Problem: Wound  Goal: Optimal Coping  Outcome: Met  Goal: Optimal Functional Ability  Outcome: Met  Goal: Absence of Infection Signs and Symptoms  Outcome: Met  Goal: Improved Oral Intake  Outcome: Met  Goal: Optimal Pain Control and Function  Outcome: Met  Goal: Skin Health and Integrity  Outcome: Met  Goal: Optimal Wound Healing  Outcome: Met     Problem: Fall Injury Risk  Goal: Absence of Fall and Fall-Related Injury  Outcome: Met      Implemented All Universal Safety Interventions:  [place] to call system. Call bell, personal items and telephone within reach. Instruct patient to call for assistance. Room bathroom lighting operational. Non-slip footwear when patient is off stretcher. Physically safe environment: no spills, clutter or unnecessary equipment. Stretcher in lowest position, wheels locked, appropriate side rails in place.

## 2025-01-08 NOTE — PT/OT/SLP EVAL
Physical Therapy Evaluation and Discharge Note    Patient Name:  Driss Morales   MRN:  [id number]    Recommendations:     Discharge therapy intensity: No Therapy Indicated   Discharge Equipment Recommendations: none   Barriers to discharge: None    Assessment:     Driss Morales is a 64 y.o. male s/p femoral-femoral bypass. .  At this time, patient is functioning at their prior level of function and does not require further acute PT services.     Recent Surgery: Procedure(s) (LRB):  CREATION, BYPASS, ARTERIAL, FEMORAL TO FEMORAL, USING GRAFT (N/A) 1 Day Post-Op    Plan:     During this hospitalization, patient does not require further acute PT services.  Please re-consult if situation changes.      Subjective     Chief Complaint: none  Patient/Family Comments/goals: none  Pain/Comfort:  Pain Rating 1: 5/10  Location - Side 1: Right  Location 1: thigh  Pain Addressed 1: Distraction, Reposition  Pain Rating Post-Intervention 1: 5/10    Patients cultural, spiritual, Adventism conflicts given the current situation: no    Living Environment:  Lives with spouse in a 1st-floor apartment.   Prior to admission, patients level of function was independent.  Equipment used at home: blood pressure machine (owns crutches).  DME owned (not currently used):  crutches .  Upon discharge, patient will have assistance from spouse.    Objective:     Communicated with nurse prior to session.  Patient found supine with telemetry, jean baptiste catheter upon PT entry to room.    General Precautions: Standard, fall    Orthopedic Precautions:N/A   Braces: N/A  Respiratory Status: Room air    Exams:  Cognitive Exam:  Patient is oriented to Person, Place, Time, and Situation  Sensation: -       Intact  RLE ROM: WFL  RLE Strength: WFL  LLE ROM: WFL  LLE Strength: WFL    Functional Mobility:  Bed Mobility:  Supine to Sit: independence  Transfers:  Sit to Stand:  independence with no AD  Gait: 150 ft independently.   Balance: good    AM-PAC 6 CLICK  MOBILITY  Total Score:24     Education Provided:  Role and goals of PT, transfer training, bed mobility, gait training, balance training, safety awareness, assistive device, strengthening exercises, and importance of participating in PT to return to PLOF.    Patient left up in chair with all lines intact and call button in reach.    GOALS:   Multidisciplinary Problems       Physical Therapy Goals       Not on file                    History:     Past Medical History:   Diagnosis Date    Atherosclerosis of native arteries of extremities with intermittent claudication, right leg     Digestive disorder     acid reflux    HTN (hypertension)     Obesity     Osteomyelitis     lower leg    Peripheral artery occlusion     Peripheral vascular obstructive disease        Past Surgical History:   Procedure Laterality Date    BELOW KNEE AMPUTATION OF LOWER EXTREMITY Bilateral 2011    secondary to Swine Flu infection    CREATION OF FEMORAL-FEMORAL ARTERY BYPASS WITH GRAFT N/A 1/7/2025    Procedure: CREATION, BYPASS, ARTERIAL, FEMORAL TO FEMORAL, USING GRAFT;  Surgeon: Alon Izaguirre MD;  Location: [facility];  Service: Peripheral Vascular;  Laterality: N/A;       Time Tracking:     PT Received On: 01/08/25  PT Start Time: 0912     PT Stop Time: 0922  PT Total Time (min): 10 min     Billable Minutes: Evaluation 10 minutes      01/08/2025

## 2025-01-08 NOTE — PROGRESS NOTES
Vascular Surgery - Progress Note  Filiberto Ritter PA-C    Patient Name: Driss Morales                   : 1960     MRN: [id number]   Date of Admission: 2025  Date of Exam: 2025     Subjective:     Post-Op Information:  CREATION, BYPASS, ARTERIAL, FEMORAL TO FEMORAL, USING GRAFT (N/A)  1 Day Post-Op    Interval History: No acute issues over night. VSS, afebrile. States pain is controlled so far with one episode of discomfort early this morning.     Objective     Vital Signs (Most Recent):      Temp: 97.7 °F (36.5 °C) (25)  Pulse: (!) 53 (25)  Resp: 18 (25)  BP: 111/65 (25)  SpO2: 97 % (25) Vital Signs (24h Range):    Temp:  [97.4 °F (36.3 °C)-98.6 °F (37 °C)] 97.7 °F (36.5 °C)  Pulse:  [53-80] 53  Resp:  [12-19] 18  SpO2:  [94 %-99 %] 97 %  BP: ()/(61-79) 111/65        Physical Exam:     Constitutional: Awake, alert, laying in bed   Cardiovascular: Palpable left popliteal pulse  Respiratory: Normal respiratory effort  Musculoskeletal: Bilateral BKAs  Skin: Bilateral groin incisions C/D/I; soft, without hematoma, ecchymosis noted        Assessment and Plan     Mr. Morales is a 64 y.o. male with osteo of right BKA as well as critical limb ischemia of RLE s/t PAD s/p left-to-right femoral-femoral crossover bypass graft as well as a right CFA endarterectomy.  - Discontinue jean baptiste catheter  - PT consulted to help mobilize patient with bilateral prosthesis  - Initiate Xarelto 2.5mg - first dose to be given here  - Likely d/c home later this afternoon if patient can void and ambulate on his own without issue   - Will get patient set up without outpatient wound care to help treat chronic osteo of the right BKA stump    The above findings, diagnostics, and treatment plan were discussed with Dr. Izaguirre who is in agreement with the plan of care except as stated in additional documentation.      Filiberto Ritter PA-C  Vascular Surgery  [facility]    Active Diagnoses:     Active Diagnoses:    Diagnosis Date Noted POA    PRINCIPAL PROBLEM:  Atherosclerosis of native arteries of extremities with intermittent claudication, right leg [I70.211] 01/07/2025 Yes      Problems Resolved During this Admission:         Medications:     Continuous Infusion:    0.9% NaCl   Intravenous Continuous           Scheduled Medications:    aspirin  81 mg Oral Daily    gabapentin  800 mg Oral TID    lisinopriL  10 mg Oral Daily    mupirocin   Nasal BID    rivaroxaban  2.5 mg Oral Daily       PRN Medications:   Current Facility-Administered Medications:     HYDROcodone-acetaminophen, 1 tablet, Oral, Q4H PRN    melatonin, 6 mg, Oral, Nightly PRN    morphine, 3 mg, Intravenous, Q1H PRN    ondansetron, 4 mg, Intravenous, Q12H PRN

## 2025-01-08 NOTE — ANESTHESIA POSTPROCEDURE EVALUATION
Anesthesia Post Evaluation    Patient: Driss Morales    Procedure(s) Performed: Procedure(s) (LRB):  CREATION, BYPASS, ARTERIAL, FEMORAL TO FEMORAL, USING GRAFT (N/A)    Final Anesthesia Type: general      Patient location during evaluation: PACU  Patient participation: Yes- Able to Participate  Level of consciousness: awake and alert and oriented  Post-procedure vital signs: reviewed and stable  Pain management: adequate  Airway patency: patent  DASHAWN mitigation strategies: Postoperative administration of CPAP, nasopharyngeal airway, or oral appliance in the postanesthesia care unit (PACU)  PONV status at discharge: No PONV  Anesthetic complications: no      Cardiovascular status: blood pressure returned to baseline  Respiratory status: unassisted, spontaneous ventilation and room air  Hydration status: euvolemic  Follow-up not needed.              Vitals Value Taken Time   /67 01/07/25 1511   Temp 37 °C (98.6 °F) 01/07/25 1330   Pulse 85 01/07/25 1628   Resp 24 01/07/25 1627   SpO2 95 % 01/07/25 1628   Vitals shown include unfiled device data.      Event Time   Out of Recovery 16:30:00         Pain/Veronica Score: Pain Rating Prior to Med Admin: 5 (1/7/2025  4:10 PM)  Veronica Score: 10 (1/7/2025  4:30 PM)

## 2025-01-08 NOTE — PLAN OF CARE
01/08/25 1110   Discharge Assessment   Assessment Type Discharge Planning Assessment   Confirmed/corrected address, phone number and insurance Yes   Confirmed Demographics Correct on Facesheet   Source of Information patient   When was your last doctors appointment?   (4 mos ago)   Communicated BONNIE with patient/caregiver Yes   Reason For Admission Atherosclerosis of native arteries of extremities with intermittent claudication, right leg   People in Home spouse   Do you expect to return to your current living situation? Yes   Do you have help at home or someone to help you manage your care at home? Yes   Who are your caregiver(s) and their phone number(s)? KHURRAM BAEZ (Significant other)  [phone] (M   Prior to hospitilization cognitive status: Alert/Oriented   Current cognitive status: Alert/Oriented   Walking or Climbing Stairs Difficulty no   Dressing/Bathing Difficulty no   Equipment Currently Used at Home blood pressure machine   Readmission within 30 days? No   Patient currently being followed by outpatient case management? No   Do you currently have service(s) that help you manage your care at home? No   Do you take prescription medications? Yes   Do you have prescription coverage? Yes   Coverage [employer]   Do you have any problems affording any of your prescribed medications? No   Is the patient taking medications as prescribed? yes   Who is going to help you get home at discharge? KHURRAM BAEZ (Significant other)  [phone] (M   How do you get to doctors appointments? car, drives self   Are you on dialysis? No   Do you take coumadin? No   Discharge Plan A Home with family   Discharge Plan B Home with family   DME Needed Upon Discharge  none   Discharge Plan discussed with: Patient;Spouse/sig other   Name(s) and Number(s) KHURRAM BAEZ (Significant other)  [phone] (M   Transition of Care Barriers None   Financial Resource Strain   How hard is it for you to pay for the very basics  like food, housing, medical care, and heating? Not very   Housing Stability   In the last 12 months, was there a time when you were not able to pay the mortgage or rent on time? N   At any time in the past 12 months, were you homeless or living in a shelter (including now)? N   Transportation Needs   Has the lack of transportation kept you from medical appointments, meetings, work or from getting things needed for daily living? No   Food Insecurity   Within the past 12 months, you worried that your food would run out before you got the money to buy more. Never true   Within the past 12 months, the food you bought just didn't last and you didn't have money to get more. Never true   Stress   Do you feel stress - tense, restless, nervous, or anxious, or unable to sleep at night because your mind is troubled all the time - these days? Not at all   Social Isolation   How often do you feel lonely or isolated from those around you?  Never   Alcohol Use   Q1: How often do you have a drink containing alcohol? Never   Utilities   In the past 12 months has the electric, gas, oil, or water company threatened to shut off services in your home? No   Health Literacy   How often do you need to have someone help you when you read instructions, pamphlets, or other written material from your doctor or pharmacy? Never   OTHER   Name(s) of People in Home KHURRAM BAEZ (Significant other)  [phone] SHO

## 2025-01-08 NOTE — NURSING
Pt educated on d/c instructions. Follow up appt and new med info given to pt. New meds are being sent to [facility] retail pharmacy. Wife went and  meds when leaving. I educated pt on s/s that require a call to the doctor or visit to hospital. VSS and NAD. Pt denies any new needs or pain at the moment. IV and telemetry discontinued. Pt being discharged home via private vehicle with family.

## 2025-01-08 NOTE — DISCHARGE SUMMARY
Vascular Surgery - Discharge Note  Filiberto Ritter PA-C      Patient Name: Driss Morales                   : 1960     MRN: [id number]   Date of Admission: 2025  Date of Exam: 2025  Vascular Physician: Alon Izaguirre MD    Outcome   Final Diagnosis:   Atherosclerosis of native arteries of extremities with intermittent claudication, right leg    Post-Op Information:  CREATION, BYPASS, ARTERIAL, FEMORAL TO FEMORAL, USING GRAFT (N/A)    Hospital Course:  Driss Morales is a 64-year-old male with critical limb ischemia of RLE secondary to PAD now status post left-to-right femoral-femoral crossover bypass graft as well as a right CFA endarterectomy. History of bilateral BKAs due to swine flu 13 years ago and osteo of the right BKA stump. Patient did well post-operatively with no acute complications. Did initiate Xarelto this hospital stay to take in conjunction with his aspirin therapy. Will also get patient set-up with outpatient wound care to address osteo. Stable for discharge home with 2 week clinic follow up.     Filiberto Ritter PA-C  Vascular Surgery  [facility]    Patient Discharge Instructions:     Disposition: Home or Self Care    Incision Instructions:   Cleanse wound area with antibacterial soap and pat dry.   Leave dermabond in place (will fall off when appropriate).   OK to shower, avoid tub bath for 10 days.    Notify your healthcare provider:  Fever greater than 100.6  Foul drainage from the incision  Breakdown or opening of the incision  Worsening pain or swelling at the surgical site  Numbness or loss of function to the operative extremity    Follow Up:     In clinic  Please contact the office with any concerns or issues. [phone]    Future Appointments   Date Time Provider Department Center   2025  1:00 PM Alon Izaguirre MD [facility]        Discharge Instructions:        Discharge Procedure Orders   Diet general     Lifting  restrictions     Call MD for:  temperature >100.4     Call MD for:  severe uncontrolled pain     Call MD for:  redness, tenderness, or signs of infection (pain, swelling, redness, odor or green/yellow discharge around incision site)     No dressing needed     Wound care routine (specify)   Order Comments: Wound care routine: Keep the incision clean with mild soap and water. Do not submerge, however, showers are okay. Do not peel glue off; it will fall off on it's own within the next 7-10 days.     Activity as tolerated         Clinical Reference Documents Added to Patient Instructions         Document    APIXABAN, ADULT (ENGLISH)    FEMOROPOPLITEAL BYPASS SURGERY DISCHARGE INSTRUCTIONS (ENGLISH)    HYDROCODONE AND ACETAMINOPHEN, ADULT (ENGLISH)    OSTEOMYELITIS DISCHARGE INSTRUCTIONS (ENGLISH)               Medication List        START taking these medications      apixaban 2.5 mg Tab  Commonly known as: ELIQUIS  Take 1 tablet (2.5 mg total) by mouth 2 (two) times daily.     HYDROcodone-acetaminophen 7.5-325 mg per tablet  Commonly known as: NORCO  Take 1 tablet by mouth every 6 (six) hours as needed for Pain.            CONTINUE taking these medications      aspirin 81 MG EC tablet  Commonly known as: ECOTRIN     ciprofloxacin HCl 750 MG tablet  Commonly known as: CIPRO     doxycycline 100 MG capsule  Commonly known as: MONODOX     eszopiclone 2 MG Tab  Commonly known as: LUNESTA     gabapentin 800 MG tablet  Commonly known as: NEURONTIN     ketorolac 10 mg tablet  Commonly known as: TORADOL     lisinopriL 10 MG tablet     oxyCODONE 15 MG Tab  Commonly known as: ROXICODONE     oxyCODONE-acetaminophen  mg per tablet  Commonly known as: PERCOCET               Where to Get Your Medications        These medications were sent to [facility] [address]  [address], [place] [address]      Phone: [phone]   apixaban 2.5 mg  Tab  HYDROcodone-acetaminophen 7.5-325 mg per tablet

## 2025-01-09 LAB
ABO + RH BLD: NORMAL
BLD PROD TYP BPU: NORMAL
BLOOD UNIT EXPIRATION DATE: NORMAL
BLOOD UNIT TYPE CODE: 7300
CROSSMATCH INTERPRETATION: NORMAL
DISPENSE STATUS: NORMAL
PSYCHE PATHOLOGY RESULT: NORMAL
UNIT NUMBER: NORMAL

## 2025-01-10 ENCOUNTER — TELEPHONE (OUTPATIENT)
Dept: VASCULAR SURGERY | Facility: CLINIC | Age: 65
End: 2025-01-10
Payer: MEDICARE

## 2025-01-10 NOTE — TELEPHONE ENCOUNTER
Driss Peñaliss is s/p Left to right femoral-femoral crossover bypass graft PTFE, right common femoral endarterectomy on 1/7/2025. He was called regarding his post-operative status. He denied signs or symptoms of infection to right groin incision. His dressing remains in place.He denies lower extremity swelling. States ambulating well with improvement in leg symptoms. Follow up information was confirmed 1/22/25 . He was advised to call with any changes or concerns. He states understanding. He did state that he needed wound care closer to where he would be staying for a couple months. New referral faxed to [facility], patient is scheduled for appointment on 1/16/25.

## 2025-01-24 NOTE — PROGRESS NOTES
"    [facility] Vascular - Clinic Note  Alon Izaguirre MD      Patient Name: Driss Morales                   : 1960      MRN: [id number]   Visit Date: 2025       History Present Illness     Reason for Visit: Post-operative Follow up     Mr. Morales presents to the clinic for a 2 week follow up s/p left to right femoral-femoral crossover bypass with PTFE and right common femoral endarterectomy on 2025. He denies fevers, drainage from his right groin incision, worsening pain or swelling at the site. He is is scheduled to see outpatient wound care with [facility] tomorrow for osteomyelitis of his right BKA stump. He reports he is able to ambulate without significant restrictions, however he is still having some intermittent pain to the right stump site. Still is having some drainage.          REVIEW OF SYSTEMS:  12 point review of systems conducted, negative except as stated in the history of present illness. See HPI for details.        Physical Exam      Vitals:    25 1022 25 1024   BP: (!) 136/90 132/87   BP Location: Right arm Left arm   Patient Position: Sitting Sitting   Pulse: 102 99   Weight: 108.9 kg (240 lb)    Height: 6' 1" (1.854 m)           General: well-nourished, no acute distress, and healthy appearing, alert, pleasant, conversant, and oriented  Respiratory: breathing easily and without respiratory distress  Cardiology: regular rate and rhythm      Musculoskeletal:   Lower Extremity: right leg is amputated above the knee     Wound:   Location: right grain  healing appropriately                      Assessment and Plan     Mr. Morales is a 64 y.o. male status post fem-fem bypass.  He does have a palpable pulse in the graft.  We would like him to start a statin in addition.  He has his 1st visit with wound care tomorrow.  We would like him to return in about 6 months with a duplex of his graft.  I did discuss that if he has any further issues with wound healing, he can come back " and we can re-evaluate him.  However, he is as well revascularized as I think is possible at this point        1. Atherosclerosis of native arteries of extremities with intermittent claudication, right leg  - atorvastatin (LIPITOR) 10 MG tablet; Take 1 tablet (10 mg total) by mouth once daily.  Dispense: 90 tablet; Refill: 3  - CV Ultrasound doppler arterial leg right; Future            Imaging Obtained/Reviewed   Study: none  Date:           Medical History     Past Medical History:   Diagnosis Date    Atherosclerosis of native arteries of extremities with intermittent claudication, right leg     Digestive disorder     acid reflux    HTN (hypertension)     Obesity     Osteomyelitis     lower leg    Peripheral artery occlusion     Peripheral vascular obstructive disease      Past Surgical History:   Procedure Laterality Date    BELOW KNEE AMPUTATION OF LOWER EXTREMITY Bilateral 2011    secondary to Swine Flu infection    CREATION OF FEMORAL-FEMORAL ARTERY BYPASS WITH GRAFT N/A 1/7/2025    Procedure: CREATION, BYPASS, ARTERIAL, FEMORAL TO FEMORAL, USING GRAFT;  Surgeon: Alon Izaguirre MD;  Location: [facility];  Service: Peripheral Vascular;  Laterality: N/A;     No family history on file.  Social History     Socioeconomic History    Marital status:    Tobacco Use    Smoking status: Former     Types: Cigarettes    Smokeless tobacco: Never   Substance and Sexual Activity    Alcohol use: Not Currently    Drug use: Never     Social Drivers of Health     Financial Resource Strain: Low Risk  (1/8/2025)    Overall Financial Resource Strain (CARDIA)     Difficulty of Paying Living Expenses: Not very hard   Food Insecurity: No Food Insecurity (1/8/2025)    Hunger Vital Sign     Worried About Running Out of Food in the Last Year: Never true     Ran Out of Food in the Last Year: Never true   Transportation Needs: No Transportation Needs (1/8/2025)    TRANSPORTATION NEEDS     Transportation : No   Stress: No Stress  Concern Present (1/8/2025)    [employer] [place] [employer] - Occupational Stress Questionnaire     Feeling of Stress : Not at all   Housing Stability: Low Risk  (1/8/2025)    Housing Stability Vital Sign     Unable to Pay for Housing in the Last Year: No     Homeless in the Last Year: No     Current Outpatient Medications   Medication Instructions    apixaban (ELIQUIS) 2.5 mg, Oral, 2 times daily    aspirin (ECOTRIN) 81 mg, Daily    atorvastatin (LIPITOR) 10 mg, Oral, Daily    ciprofloxacin HCl (CIPRO) 750 mg, 2 times daily    doxycycline (MONODOX) 100 mg, 2 times daily    eszopiclone (LUNESTA) 2 mg, Nightly PRN    gabapentin (NEURONTIN) 800 mg, 3 times daily    ketorolac (TORADOL) 10 mg, Every 6 hours PRN    lisinopriL 10 mg, Daily    oxyCODONE (ROXICODONE) 30 mg, Every 4 hours PRN    oxyCODONE-acetaminophen (PERCOCET)  mg per tablet 1 tablet, Oral, Every 4 hours PRN     Review of patient's allergies indicates:   Allergen Reactions    Penicillins        Patient Care Team:  Hortensia Garza NP as PCP - General (Internal Medicine)  Aniket Isaac MD as Consulting Physician (Internal Medicine)  Khadar Machado MD as Consulting Physician (Infectious Diseases)  Alon Izaguirre MD as Vascular Surgery (Vascular Surgery)        No follow-ups on file. In addition to their scheduled follow up, the patient has also been instructed to follow up on as needed basis.     Future Appointments   Date Time Provider Department Center   7/30/2025 10:30 AM CV [facility] VASCULAR [facility]   7/30/2025 11:00 AM Alon Izaguirre MD [facility]

## 2025-01-27 ENCOUNTER — OFFICE VISIT (OUTPATIENT)
Dept: VASCULAR SURGERY | Facility: CLINIC | Age: 65
End: 2025-01-27
Payer: MEDICARE

## 2025-01-27 VITALS
WEIGHT: 240 LBS | BODY MASS INDEX: 31.81 KG/M2 | HEART RATE: 99 BPM | HEIGHT: 73 IN | SYSTOLIC BLOOD PRESSURE: 132 MMHG | DIASTOLIC BLOOD PRESSURE: 87 MMHG

## 2025-01-27 DIAGNOSIS — I70.211 ATHEROSCLEROSIS OF NATIVE ARTERIES OF EXTREMITIES WITH INTERMITTENT CLAUDICATION, RIGHT LEG: Primary | ICD-10-CM

## 2025-01-27 PROCEDURE — 3288F FALL RISK ASSESSMENT DOCD: CPT | Mod: CPTII,,, | Performed by: SURGERY

## 2025-01-27 PROCEDURE — 1160F RVW MEDS BY RX/DR IN RCRD: CPT | Mod: CPTII,,, | Performed by: SURGERY

## 2025-01-27 PROCEDURE — 3079F DIAST BP 80-89 MM HG: CPT | Mod: CPTII,,, | Performed by: SURGERY

## 2025-01-27 PROCEDURE — 1101F PT FALLS ASSESS-DOCD LE1/YR: CPT | Mod: CPTII,,, | Performed by: SURGERY

## 2025-01-27 PROCEDURE — 3075F SYST BP GE 130 - 139MM HG: CPT | Mod: CPTII,,, | Performed by: SURGERY

## 2025-01-27 PROCEDURE — 99024 POSTOP FOLLOW-UP VISIT: CPT | Mod: ,,, | Performed by: SURGERY

## 2025-01-27 PROCEDURE — 1159F MED LIST DOCD IN RCRD: CPT | Mod: CPTII,,, | Performed by: SURGERY

## 2025-01-27 RX ORDER — ATORVASTATIN CALCIUM 10 MG/1
10 TABLET, FILM COATED ORAL DAILY
Qty: 90 TABLET | Refills: 3 | Status: SHIPPED | OUTPATIENT
Start: 2025-01-27 | End: 2026-01-27

## 2025-06-02 RX ORDER — CLOPIDOGREL BISULFATE 75 MG/1
75 TABLET ORAL DAILY
Qty: 30 TABLET | Refills: 11 | Status: SHIPPED | OUTPATIENT
Start: 2025-06-02 | End: 2026-06-02

## 2025-08-01 ENCOUNTER — TELEPHONE (OUTPATIENT)
Dept: VASCULAR SURGERY | Facility: CLINIC | Age: 65
End: 2025-08-01
Payer: MEDICARE

## (undated) DEVICE — SUT 7/0 24IN PROLENE BL MO

## (undated) DEVICE — COVER PROBE US 5.5X58L NON LTX

## (undated) DEVICE — TRAY CATH 1-LYR URIMTR 16FR

## (undated) DEVICE — SUT PROLENE 5/0 RB-1 36 IN

## (undated) DEVICE — POWDER ARISTA AH 3G

## (undated) DEVICE — DRESSING AQUACEL AG ADV 3.5X6

## (undated) DEVICE — SPONGE COTTON TRAY 4X4IN

## (undated) DEVICE — WIPE MERCL POLYVIL ACETL 3X3IN

## (undated) DEVICE — DRAPE SLUSH WARMER 66X44IN

## (undated) DEVICE — Device

## (undated) DEVICE — SUT MONOCRYL 4-0 PS-1 UND

## (undated) DEVICE — ELECTRODE PATIENT RETURN DISP

## (undated) DEVICE — TAPE UMBILICAL 1/8X36IN WHITE

## (undated) DEVICE — KIT SURGICAL TURNOVER

## (undated) DEVICE — DRAPE FULL SHEET 70X100IN

## (undated) DEVICE — SUT PROLENE 6-0 C-1 30IN BL

## (undated) DEVICE — DRESSING TELFA + BARR 4X6IN

## (undated) DEVICE — SUT VICRYL 3-0 27 SH

## (undated) DEVICE — SUT SILK 2-0 STRANDS 30IN

## (undated) DEVICE — SYR 30CC LUER LOCK

## (undated) DEVICE — APPLICATOR CHLORAPREP ORN 26ML

## (undated) DEVICE — SUT 4-0 12-18IN SILK BLACK

## (undated) DEVICE — DRAPE INCISE IOBAN 2 23X23IN

## (undated) DEVICE — SUT ETHILON 2-0 FSLX 30 BLK

## (undated) DEVICE — SOL NORMAL USPCA 0.9%

## (undated) DEVICE — GLOVE PROTEXIS PI SYN SURG 7.5

## (undated) DEVICE — KIT SURGIFLO HEMOSTATIC MATRIX

## (undated) DEVICE — SOL IRR NACL .9% 1000CC

## (undated) DEVICE — SUT PROLENE 6-0 BL C-1 C-1

## (undated) DEVICE — SUT 2-0 VICRYL / CT-1

## (undated) DEVICE — GLOVE PROTEXIS LTX MICRO 6.5

## (undated) DEVICE — PROBE DOPPLER VTI DISP 8MHZ

## (undated) DEVICE — BLADE MICROSHARP BLUE 3MM 15DE

## (undated) DEVICE — SUT 3-0 12-18IN SILK

## (undated) DEVICE — SUT PROLENE 6-0 BV-1 30IN

## (undated) DEVICE — BAG MEDI-PLAST DECANTER C-FLOW